# Patient Record
Sex: MALE | Race: WHITE | Employment: FULL TIME | URBAN - METROPOLITAN AREA
[De-identification: names, ages, dates, MRNs, and addresses within clinical notes are randomized per-mention and may not be internally consistent; named-entity substitution may affect disease eponyms.]

---

## 2019-06-19 ENCOUNTER — APPOINTMENT (OUTPATIENT)
Dept: CT IMAGING | Age: 39
DRG: 092 | End: 2019-06-19

## 2019-06-19 ENCOUNTER — HOSPITAL ENCOUNTER (INPATIENT)
Age: 39
LOS: 1 days | Discharge: HOME | DRG: 092 | End: 2019-06-20
Attending: EMERGENCY MEDICINE | Admitting: FAMILY MEDICINE

## 2019-06-19 DIAGNOSIS — R44.9 SENSORY DEFICIT, LEFT: ICD-10-CM

## 2019-06-19 DIAGNOSIS — R47.1 DYSARTHRIA: Primary | ICD-10-CM

## 2019-06-19 PROBLEM — I63.9 ACUTE CVA (CEREBROVASCULAR ACCIDENT) (HCC): Status: ACTIVE | Noted: 2019-06-19

## 2019-06-19 PROBLEM — S91.319A FOOT LACERATION: Status: ACTIVE | Noted: 2019-06-19

## 2019-06-19 PROBLEM — R47.81 SLURRED SPEECH: Status: ACTIVE | Noted: 2019-06-19

## 2019-06-19 LAB
A/G RATIO: 1.4 (ref 1.1–2.2)
ALBUMIN SERPL-MCNC: 4.4 G/DL (ref 3.4–5)
ALP BLD-CCNC: 85 U/L (ref 40–129)
ALT SERPL-CCNC: 29 U/L (ref 10–40)
ANION GAP SERPL CALCULATED.3IONS-SCNC: 14 MMOL/L (ref 3–16)
APTT: 34.7 SEC (ref 26–36)
AST SERPL-CCNC: 24 U/L (ref 15–37)
BASOPHILS ABSOLUTE: 0 K/UL (ref 0–0.2)
BASOPHILS RELATIVE PERCENT: 0.5 %
BILIRUB SERPL-MCNC: 0.4 MG/DL (ref 0–1)
BUN BLDV-MCNC: 11 MG/DL (ref 7–20)
CALCIUM SERPL-MCNC: 9.5 MG/DL (ref 8.3–10.6)
CHLORIDE BLD-SCNC: 104 MMOL/L (ref 99–110)
CO2: 23 MMOL/L (ref 21–32)
CREAT SERPL-MCNC: 0.7 MG/DL (ref 0.9–1.3)
EOSINOPHILS ABSOLUTE: 0.2 K/UL (ref 0–0.6)
EOSINOPHILS RELATIVE PERCENT: 2.4 %
GFR AFRICAN AMERICAN: >60
GFR NON-AFRICAN AMERICAN: >60
GLOBULIN: 3.1 G/DL
GLUCOSE BLD-MCNC: 119 MG/DL (ref 70–99)
HCT VFR BLD CALC: 42 % (ref 40.5–52.5)
HEMOGLOBIN: 14.2 G/DL (ref 13.5–17.5)
INR BLD: 1.18 (ref 0.86–1.14)
LYMPHOCYTES ABSOLUTE: 2.1 K/UL (ref 1–5.1)
LYMPHOCYTES RELATIVE PERCENT: 33.7 %
MCH RBC QN AUTO: 26.3 PG (ref 26–34)
MCHC RBC AUTO-ENTMCNC: 33.7 G/DL (ref 31–36)
MCV RBC AUTO: 78 FL (ref 80–100)
MONOCYTES ABSOLUTE: 0.5 K/UL (ref 0–1.3)
MONOCYTES RELATIVE PERCENT: 7.6 %
NEUTROPHILS ABSOLUTE: 3.5 K/UL (ref 1.7–7.7)
NEUTROPHILS RELATIVE PERCENT: 55.8 %
PDW BLD-RTO: 14.1 % (ref 12.4–15.4)
PLATELET # BLD: 211 K/UL (ref 135–450)
PMV BLD AUTO: 7.6 FL (ref 5–10.5)
POTASSIUM REFLEX MAGNESIUM: 4 MMOL/L (ref 3.5–5.1)
PROTHROMBIN TIME: 13.5 SEC (ref 9.8–13)
RBC # BLD: 5.39 M/UL (ref 4.2–5.9)
SODIUM BLD-SCNC: 141 MMOL/L (ref 136–145)
TOTAL PROTEIN: 7.5 G/DL (ref 6.4–8.2)
TROPONIN: <0.01 NG/ML
WBC # BLD: 6.2 K/UL (ref 4–11)

## 2019-06-19 PROCEDURE — 85730 THROMBOPLASTIN TIME PARTIAL: CPT

## 2019-06-19 PROCEDURE — 6360000004 HC RX CONTRAST MEDICATION: Performed by: EMERGENCY MEDICINE

## 2019-06-19 PROCEDURE — 93005 ELECTROCARDIOGRAM TRACING: CPT | Performed by: STUDENT IN AN ORGANIZED HEALTH CARE EDUCATION/TRAINING PROGRAM

## 2019-06-19 PROCEDURE — 84484 ASSAY OF TROPONIN QUANT: CPT

## 2019-06-19 PROCEDURE — 90715 TDAP VACCINE 7 YRS/> IM: CPT | Performed by: STUDENT IN AN ORGANIZED HEALTH CARE EDUCATION/TRAINING PROGRAM

## 2019-06-19 PROCEDURE — 80053 COMPREHEN METABOLIC PANEL: CPT

## 2019-06-19 PROCEDURE — 85610 PROTHROMBIN TIME: CPT

## 2019-06-19 PROCEDURE — 99285 EMERGENCY DEPT VISIT HI MDM: CPT

## 2019-06-19 PROCEDURE — 70498 CT ANGIOGRAPHY NECK: CPT

## 2019-06-19 PROCEDURE — 85025 COMPLETE CBC W/AUTO DIFF WBC: CPT

## 2019-06-19 PROCEDURE — 70496 CT ANGIOGRAPHY HEAD: CPT

## 2019-06-19 PROCEDURE — 36415 COLL VENOUS BLD VENIPUNCTURE: CPT

## 2019-06-19 PROCEDURE — 6360000002 HC RX W HCPCS: Performed by: STUDENT IN AN ORGANIZED HEALTH CARE EDUCATION/TRAINING PROGRAM

## 2019-06-19 PROCEDURE — 90471 IMMUNIZATION ADMIN: CPT | Performed by: STUDENT IN AN ORGANIZED HEALTH CARE EDUCATION/TRAINING PROGRAM

## 2019-06-19 PROCEDURE — 70450 CT HEAD/BRAIN W/O DYE: CPT

## 2019-06-19 PROCEDURE — 1200000000 HC SEMI PRIVATE

## 2019-06-19 RX ADMIN — TETANUS TOXOID, REDUCED DIPHTHERIA TOXOID AND ACELLULAR PERTUSSIS VACCINE, ADSORBED 0.5 ML: 5; 2.5; 8; 8; 2.5 SUSPENSION INTRAMUSCULAR at 22:10

## 2019-06-19 RX ADMIN — IOPAMIDOL 80 ML: 755 INJECTION, SOLUTION INTRAVENOUS at 21:59

## 2019-06-20 ENCOUNTER — APPOINTMENT (OUTPATIENT)
Dept: MRI IMAGING | Age: 39
DRG: 092 | End: 2019-06-20

## 2019-06-20 VITALS
HEIGHT: 74 IN | RESPIRATION RATE: 18 BRPM | SYSTOLIC BLOOD PRESSURE: 130 MMHG | OXYGEN SATURATION: 97 % | BODY MASS INDEX: 40.43 KG/M2 | DIASTOLIC BLOOD PRESSURE: 83 MMHG | HEART RATE: 69 BPM | WEIGHT: 315 LBS | TEMPERATURE: 97.6 F

## 2019-06-20 LAB
CHOLESTEROL, TOTAL: 138 MG/DL (ref 0–199)
EKG ATRIAL RATE: 73 BPM
EKG DIAGNOSIS: NORMAL
EKG P AXIS: 39 DEGREES
EKG P-R INTERVAL: 162 MS
EKG Q-T INTERVAL: 396 MS
EKG QRS DURATION: 94 MS
EKG QTC CALCULATION (BAZETT): 436 MS
EKG R AXIS: 26 DEGREES
EKG T AXIS: 15 DEGREES
EKG VENTRICULAR RATE: 73 BPM
HCT VFR BLD CALC: 42.6 % (ref 40.5–52.5)
HDLC SERPL-MCNC: 23 MG/DL (ref 40–60)
HEMOGLOBIN: 14.3 G/DL (ref 13.5–17.5)
LDL CHOLESTEROL CALCULATED: 80 MG/DL
LV EF: 50 %
LVEF MODALITY: NORMAL
MCH RBC QN AUTO: 26.3 PG (ref 26–34)
MCHC RBC AUTO-ENTMCNC: 33.5 G/DL (ref 31–36)
MCV RBC AUTO: 78.5 FL (ref 80–100)
PDW BLD-RTO: 14.2 % (ref 12.4–15.4)
PLATELET # BLD: 206 K/UL (ref 135–450)
PMV BLD AUTO: 7.6 FL (ref 5–10.5)
RBC # BLD: 5.43 M/UL (ref 4.2–5.9)
TRIGL SERPL-MCNC: 175 MG/DL (ref 0–150)
VLDLC SERPL CALC-MCNC: 35 MG/DL
WBC # BLD: 6.8 K/UL (ref 4–11)

## 2019-06-20 PROCEDURE — 83036 HEMOGLOBIN GLYCOSYLATED A1C: CPT

## 2019-06-20 PROCEDURE — 70551 MRI BRAIN STEM W/O DYE: CPT

## 2019-06-20 PROCEDURE — 6360000002 HC RX W HCPCS: Performed by: FAMILY MEDICINE

## 2019-06-20 PROCEDURE — 97161 PT EVAL LOW COMPLEX 20 MIN: CPT

## 2019-06-20 PROCEDURE — 6370000000 HC RX 637 (ALT 250 FOR IP): Performed by: FAMILY MEDICINE

## 2019-06-20 PROCEDURE — 80061 LIPID PANEL: CPT

## 2019-06-20 PROCEDURE — 97165 OT EVAL LOW COMPLEX 30 MIN: CPT

## 2019-06-20 PROCEDURE — 92610 EVALUATE SWALLOWING FUNCTION: CPT

## 2019-06-20 PROCEDURE — 85027 COMPLETE CBC AUTOMATED: CPT

## 2019-06-20 PROCEDURE — 2580000003 HC RX 258: Performed by: FAMILY MEDICINE

## 2019-06-20 PROCEDURE — 36415 COLL VENOUS BLD VENIPUNCTURE: CPT

## 2019-06-20 PROCEDURE — 6360000004 HC RX CONTRAST MEDICATION: Performed by: INTERNAL MEDICINE

## 2019-06-20 PROCEDURE — C8929 TTE W OR WO FOL WCON,DOPPLER: HCPCS

## 2019-06-20 RX ORDER — ASPIRIN 81 MG/1
81 TABLET ORAL DAILY
Status: DISCONTINUED | OUTPATIENT
Start: 2019-06-20 | End: 2019-06-20 | Stop reason: HOSPADM

## 2019-06-20 RX ORDER — SODIUM CHLORIDE 0.9 % (FLUSH) 0.9 %
10 SYRINGE (ML) INJECTION EVERY 12 HOURS SCHEDULED
Status: DISCONTINUED | OUTPATIENT
Start: 2019-06-20 | End: 2019-06-20 | Stop reason: HOSPADM

## 2019-06-20 RX ORDER — ATORVASTATIN CALCIUM 80 MG/1
80 TABLET, FILM COATED ORAL NIGHTLY
Status: DISCONTINUED | OUTPATIENT
Start: 2019-06-20 | End: 2019-06-20 | Stop reason: HOSPADM

## 2019-06-20 RX ORDER — ONDANSETRON 2 MG/ML
4 INJECTION INTRAMUSCULAR; INTRAVENOUS EVERY 6 HOURS PRN
Status: DISCONTINUED | OUTPATIENT
Start: 2019-06-20 | End: 2019-06-20 | Stop reason: HOSPADM

## 2019-06-20 RX ORDER — SODIUM CHLORIDE 0.9 % (FLUSH) 0.9 %
10 SYRINGE (ML) INJECTION PRN
Status: DISCONTINUED | OUTPATIENT
Start: 2019-06-20 | End: 2019-06-20 | Stop reason: HOSPADM

## 2019-06-20 RX ORDER — ASPIRIN 300 MG/1
300 SUPPOSITORY RECTAL DAILY
Status: DISCONTINUED | OUTPATIENT
Start: 2019-06-20 | End: 2019-06-20 | Stop reason: HOSPADM

## 2019-06-20 RX ORDER — LABETALOL 20 MG/4 ML (5 MG/ML) INTRAVENOUS SYRINGE
10 EVERY 10 MIN PRN
Status: DISCONTINUED | OUTPATIENT
Start: 2019-06-20 | End: 2019-06-20 | Stop reason: HOSPADM

## 2019-06-20 RX ADMIN — ENOXAPARIN SODIUM 40 MG: 40 INJECTION SUBCUTANEOUS at 08:41

## 2019-06-20 RX ADMIN — PERFLUTREN 2.2 MG: 6.52 INJECTION, SUSPENSION INTRAVENOUS at 12:54

## 2019-06-20 RX ADMIN — ATORVASTATIN CALCIUM 80 MG: 80 TABLET, FILM COATED ORAL at 02:02

## 2019-06-20 RX ADMIN — Medication 10 ML: at 09:00

## 2019-06-20 RX ADMIN — ASPIRIN 81 MG: 81 TABLET, COATED ORAL at 08:41

## 2019-06-20 ASSESSMENT — PAIN SCALES - GENERAL
PAINLEVEL_OUTOF10: 0

## 2019-06-20 NOTE — PROGRESS NOTES
Occupational Therapy   Occupational Therapy Initial Assessment and Discharge   Date: 2019   Patient Name: Connie Maguire  MRN: [id number]     : 1980    Date of Service: 2019    Discharge Recommendations: Connie Maguire scored a 24/24 on the AM-PAC ADL Inpatient form. At this time, no further OT is recommended upon discharge. Recommend patient returns to prior setting with prior services. Assessment: Pt's functional independence appears at baseline. Pt has no residual deficits, related to ADLs, strength, ROM, or balance. Pt has been getting up ad luda in room. No further acute OT needs identified. Will sign off. OT Equipment Recommendations  Equipment Needed: No    Assessment   Performance deficits / Impairments: Decreased functional mobility ; Decreased ADL status; Decreased balance    Decision Making: Low Complexity    Patient Education: Role of OT- pt verb understanding    REQUIRES OT FOLLOW UP: No    Activity Tolerance: Patient Tolerated treatment well    Safety Devices in place: Yes  Type of devices: Left in chair;Nurse notified;Call light within reach               Restrictions  Position Activity Restriction  Other position/activity restrictions: up as tolerated    Subjective   General  Chart Reviewed: Yes    Additional Pertinent Hx: 37 y.o. M adm  with aphasia and mild right facial droop. Head CT, CTA of head/neck all (-) for acute findings. MRI pending. PMHx= L knee replacement    Family / Caregiver Present: No    Diagnosis: Acute CVA    Subjective  Subjective: Pt found eob upon entry; agreeable to OT.      Pain Assessment: Denies     Social/Functional History  Social/Functional History  Lives With: (Significant other + 4 kids (9-23 y.o.))  Type of Home: House  Home Layout: Two level, Performs ADL's on one level  Home Access: Stairs to enter with rails  Entrance Stairs - Number of Steps: 2 NARCISA  Bathroom Shower/Tub: Tub/Shower unit  Bathroom Toilet: Standard(\"comfort height,\" [facility] nearby for leverage)  Bathroom Equipment: (none)  Home Equipment: (none)  ADL Assistance: Independent  Homemaking Assistance: Independent  Ambulation Assistance: Independent  Transfer Assistance: Independent  Active : Yes  Occupation: Full time employment  Type of occupation: Plumbing       Objective   Treatment included functional transfer training, ADLs, and patient education. Vision: Within Functional Limits  Hearing: Within functional limits      Orientation  Overall Orientation Status: Within Functional Limits    Standing Balance  Activity: functional mobility in room/restroom and hallway, standing for ADLs, transfers     Functional Mobility  Functional - Mobility Device: No device  Activity: To/from bathroom(+ hallway)  Assist Level: Independent    Toilet Transfers  Toilet - Technique: Ambulating  Equipment Used: Standard toilet  Toilet Transfer: Independent    ADL  Feeding: Independent  Grooming: Independent  UE Dressing: Independent  LE Dressing: Independent  Additional Comments: Pt able to complete all ADL tasks in combination of sitting/standing (don shirt, shorts, shoes)    Bed mobility  Supine to Sit: Independent  Sit to Supine: Independent    Transfers  Sit to stand: Independent  Stand to sit: Independent    Cognition  Overall Cognitive Status: WFL    Sensation  Overall Sensation Status: WFL    LUE AROM (degrees)  LUE AROM : WFL  RUE AROM (degrees)  RUE AROM : WFL  LUE Strength  Gross LUE Strength: WFL  RUE Strength  Gross RUE Strength: WFL         Plan   Plan  Times per week: d/c       AM-PAC Score        AM-[facility] Inpatient Daily Activity Raw Score: 24 (06/20/19 0837)  AM-PAC Inpatient ADL T-Scale Score : 57.54 (06/20/19 0837)  ADL Inpatient CMS 0-100% Score: 0 (06/20/19 0837)  ADL Inpatient CMS G-Code Modifier : 509 [address] (06/20/19 8781)    Goals  Patient Goals   Patient goals :  To return home today        Therapy Time   Individual Concurrent Group Co-treatment   Time In 0740         Time Out 0800         Minutes 20         Timed Code Treatment Minutes:   0    Total Treatment Minutes:  [facility] Pat Nakul Lot

## 2019-06-20 NOTE — CARE COORDINATION
discharge, or pharmacy can deliver to the bedside if staff is available. (payment due at time of pick-up or delivery - cash, check, or card accepted)     Able to afford home medications/ co-pay costs: Yes    ADLS  Support Systems: Spouse/Significant Other    PT AM-PAC: 24 /24  OT AM-PAC: 24 /24    [place]: from single family home  Steps: 2 steps to enter    Plans to RETURN to current housing: Yes  Barriers to RETURNING to current housing: None noted    Home Care Information  Currently ACTIVE with [facility] [address]: No  Home Care Agency: Not Applicable    Currently ACTIVE with [place] [facility]: No  Passport/ Waiver: No  Passport/ Waiver Services: Not Applicable              DISCHARGE PLAN:  Disposition: Home    Transportation PLAN for discharge: family     Factors facilitating achievement of predicted outcomes: Family support, Cooperative and Pleasant    Barriers to discharge: waiting for an MRI    Additional Case Management Notes:may need OP therapy    Gerry Meza and his family were provided with choice of provider; he and his family are in agreement with the discharge plan.     Care Transition patient: No    Chelsie Head RN  [facility] [email or web address]  Case Management Department  Ph: [phone]   Fax: [phone]

## 2019-06-20 NOTE — ED PROVIDER NOTES
He appears well-developed and well-nourished. No distress. HENT:   Head: Normocephalic and atraumatic. Mouth/Throat: Oropharynx is clear and moist. No oropharyngeal exudate. Eyes: Pupils are equal, round, and reactive to light. Conjunctivae and EOM are normal. Right eye exhibits no discharge. Left eye exhibits no discharge. No scleral icterus. Neck: Normal range of motion. Neck supple. No JVD present. No tracheal deviation present. No thyromegaly present. Neck is large   Cardiovascular: Normal rate, regular rhythm, normal heart sounds and intact distal pulses. Exam reveals no gallop and no friction rub. No murmur heard. Pulmonary/Chest: Effort normal and breath sounds normal. No stridor. No respiratory distress. He has no wheezes. He has no rales. Abdominal: Soft. Bowel sounds are normal. He exhibits no distension and no mass. There is no tenderness. There is no rebound and no guarding. Musculoskeletal: Normal range of motion. He exhibits no edema, tenderness or deformity. Lymphadenopathy:     He has no cervical adenopathy. Neurological: He is alert and oriented to person, place, and time. He displays normal reflexes. A sensory deficit is present. No cranial nerve deficit. He exhibits normal muscle tone. Coordination normal.   Mild right sided facial droop. Dysarthria noted, patient slurs some words, but not severe. Cranial nerves otherwise unremarkable. Decreased sensation in plantar aspect of right foot in region of incision. No dysmetria. Normal heel-to-shin. Skin: Skin is warm and dry. No rash noted. He is not diaphoretic. Healing incision along the plantar aspect of the right foot near calcaneous with surrounding erythema. No purulence expressed. No palpable fluid collection   Psychiatric: He has a normal mood and affect.  His behavior is normal. Judgment and thought content normal.        Diagnostic Results     EKG   Interpreted by emergency department physician Carla Paez, significant vascular abnormality in the neck. 2. Prominent lymph nodes presumably normal for patient age. No suspicious abnormality otherwise. PROCEDURE: CT ANGIOGRAPHY HEAD WITH/WITHOUT CONTRAST      INDICATION: Possible stroke. Dysarthria w/ right sided facial droop      COMPARISON: none      TECHNIQUE: Axial CT imaging obtained through the head prior to and following administration of IV contrast. Axial images, multiplanar reformatted images, and maximum intensity projection images were reviewed for CT angiographic technique. IV contrast: 80 mL Isovue-370. FINDINGS:      ANTERIOR CIRCULATION: The intracranial internal carotid arteries, anterior cerebral arteries, and middle cerebral arteries demonstrate no occlusion or stenosis. No evidence for aneurysm or arteriovenous malformation. POSTERIOR CIRCULATION: The bilateral vertebral arteries, basilar artery and posterior cerebral arteries demonstrate no occlusion or stenosis. No evidence for aneurysm or arteriovenous malformation. INTRACRANIAL VENOUS SYSTEM: No evidence for intracranial venous thrombosis. INTRACRANIAL HEMORRHAGE: None. VENTRICLES: Normal in size and configuration for age. BRAIN PARENCHYMA: Gray-white matter differentiation is normal. No intracranial mass effect. SKULL: No destructive osseous process or fracture. PARANASAL SINUSES / MASTOIDS: No acute sinusitis or mastoiditis. ORBITS: Normal.      EXTRACRANIAL SOFT TISSUES: Normal.      OTHER: None. IMPRESSION:      1. No significant intracranial vascular abnormality. Overall normal CT imaging of the head. CT HEAD WO CONTRAST   Final Result   1. No acute intracranial abnormality. LABS:   Labs Reviewed   CBC WITH AUTO DIFFERENTIAL - Abnormal; Notable for the following components:       Result Value    MCV 78.0 (*)     All other components within normal limits    Narrative:     Performed at:   [facility]  [address] [facility] [address]   Phone [phone]   COMPREHENSIVE METABOLIC PANEL W/ REFLEX TO MG FOR LOW K - Abnormal; Notable for the following components:    Glucose 119 (*)     CREATININE 0.7 (*)     All other components within normal limits    Narrative:     Performed at: [facility] - [facility]  [address], [facility] [address]   Phone [phone]   PROTIME-INR - Abnormal; Notable for the following components:    Protime 13.5 (*)     INR 1.18 (*)     All other components within normal limits    Narrative:     Performed at: [facility]  [address], [facility] [address]   Phone [phone]   APTT    Narrative:     Performed at: [facility]  [address] [facility] [address]   Phone [phone]   TROPONIN    Narrative:     Performed at: [facility]  [address] [facility] [address]   Phone [phone]       RECENT VITALS:  BP: 136/73,  , Pulse: 71,Resp: 16     Procedures     None    ED Course     The patient was given the following medications:  Orders Placed This Encounter   Medications    iopamidol (ISOVUE-370) 76 % injection 80 mL    Tetanus-Diphth-Acell Pertussis (BOOSTRIX) injection 0.5 mL       CONSULTS:  Minesh Vazquez     Trent Garcia is a 37 y.o. male with no known PMH who presented with slurred speech. Did have mild right-sided facial droop and left sided sensory deficits on exam, but symptoms began 6/16-6/17 so outside the window for intervention. CT head w/o contrast and CTA head and neck unremarkable. Patient will be admitted for stroke workup. He stepped on a floresita razor on 6/14, unsure of last tetanus shot.   He endorsed some muscle cramping in his right calf, but otherwise no lock-jaw or symptoms concerning for tetanus. He was given Tdap in the ED prior to admission. This patient was also evaluated by the attending physician. All care plans were discussed and agreed upon. Clinical Impression     1. Dysarthria    2. Sensory deficit, left        Disposition/Plan     PATIENT REFERRED TO:  No follow-up provider specified.     DISCHARGE MEDICATIONS:  New Prescriptions    No medications on file       DISPOSITION Admitted 06/19/2019 11:37:19 PM       Patsy Villatoro MD  Resident  06/19/19 9974

## 2019-06-20 NOTE — DISCHARGE SUMMARY
Hospital Medicine Discharge Summary    Patient ID: Jeanie Fajardo      Patient's PCP: No primary care provider on file. Admit Date: 6/19/2019     Discharge Date:   06/20/19    Admitting Physician: Tray Harrison MD     Discharge Physician: Aminata Bernal MD     Discharge Diagnoses: Active Hospital Problems    Diagnosis Date Noted    Slurred speech [R47.81] 06/19/2019    Foot laceration [S91.319A] 06/19/2019    Acute CVA (cerebrovascular accident) [facility]) [I63.9] 06/19/2019       The patient was seen and examined on day of discharge and this discharge summary is in conjunction with any daily progress note from day of discharge. Hospital Course:   Slurred speech:  Pt had gait disturbances and slurred speech. Was thought to be secondary to TIA/stroke. Aspirin and statin was started. Neurology was consulted. CT head/CTA was negative. Lipid panel showed hypertriglyceridemia. Echo was normal. MRI showed chronic small vessel changes. Symptoms had been improving per wife. Pt works as a . Neurology recommended no further workup, discontinuing aspirin and statin as pt symptoms were likely from toxin or possible virus. Also if recurrent episodes happen, pt to return to hospital and will need EEG/CSF studies. Diet and exercise was advised. Pt to schedule an appointment with a PCP.     R foot laceration:  Was healing. Tdap was given in the ED.     Morbid Obesity:  Diet and exercise advised      Physical Exam Performed:     /83   Pulse 69   Temp 97.6 °F (36.4 °C) (Oral)   Resp 18   Ht 6' 2\" (1.88 m)   Wt (!) 327 lb 8 oz (148.6 kg)   SpO2 97%   BMI 42.05 kg/m²       General appearance:  No apparent distress, appears stated age and cooperative. Morbid Obesity  HEENT:  Normal cephalic, atraumatic without obvious deformity. Pupils equal, round, and reactive to light. Extra ocular muscles intact. Conjunctivae/corneas clear. Neck: Supple, with full range of motion.  No jugular venous distention. Trachea midline. Respiratory:  Normal respiratory effort. Clear to auscultation, bilaterally without Rales/Wheezes/Rhonchi. Cardiovascular:  Regular rate and rhythm with normal S1/S2 without murmurs, rubs or gallops. Abdomen: Soft, non-tender, non-distended with normal bowel sounds. Musculoskeletal:  No clubbing, cyanosis or edema bilaterally. Full range of motion without deformity. Skin: Skin color, texture, turgor normal.  No rashes or lesions. Neurologic:  Neurovascularly intact without any focal sensory/motor deficits. Cranial nerves: II-XII intact, grossly non-focal.  Psychiatric:  Alert and oriented, thought content appropriate, normal insight  Capillary Refill: Brisk,< 3 seconds   Peripheral Pulses: +2 palpable, equal bilaterally       Labs: For convenience and continuity at follow-up the following most recent labs are provided:      CBC:    Lab Results   Component Value Date    WBC 6.8 06/20/2019    HGB 14.3 06/20/2019    HCT 42.6 06/20/2019     06/20/2019       Renal:    Lab Results   Component Value Date     06/19/2019    K 4.0 06/19/2019     06/19/2019    CO2 23 06/19/2019    BUN 11 06/19/2019    CREATININE 0.7 06/19/2019    CALCIUM 9.5 06/19/2019         Significant Diagnostic Studies    Radiology:   MRI brain without contrast   Final Result      1. No acute intracranial abnormality. No evidence of acute ischemia. 2. FLAIR images and straight minimal punctate nonspecific white matter signal abnormalities which may reflect chronic small vessel ischemic disease and/or age related degenerative change. CTA NECK W CONTRAST   Final Result      1. No significant vascular abnormality in the neck. 2. Prominent lymph nodes presumably normal for patient age. No suspicious abnormality otherwise. PROCEDURE: CT ANGIOGRAPHY HEAD WITH/WITHOUT CONTRAST      INDICATION: Possible stroke.  Dysarthria w/ right sided facial droop      COMPARISON: none

## 2019-06-20 NOTE — PROGRESS NOTES
Education: Role of SLP  Patient Education Response: Verbalizes understanding  Safety Devices in place: Yes  Type of devices: Call light within reach           Therapy Time  SLP Individual Minutes  Time In: 0820  Time Out: 0840  Minutes: 20          Pt's goal: pt denied difficulty with communication and swallowing so did not state goal       Plan:  Continue goals per POC  Recommended diet:regular with thin liquids-Make NPO if s/s aspiration emerge and alert SLP  Will monitor MRI results- if + for CVA, will follow up, if not, then will d/c from Speech Therapy services   Total treatment time:20  Pt's discharge plan:to go home   Discharge Plan: To be determined closer to discharge  Discussed with RNAntonio   Needs within reach.        Harry Ralph, [address] [place] [address] Risa, [facility]- [address] [place] [address]  Pg # [phone]  This document will serve as a discharge summary if pt discharge before next treatment   session

## 2019-06-21 LAB
EKG ATRIAL RATE: 78 BPM
EKG DIAGNOSIS: NORMAL
EKG P AXIS: 37 DEGREES
EKG P-R INTERVAL: 162 MS
EKG Q-T INTERVAL: 394 MS
EKG QRS DURATION: 96 MS
EKG QTC CALCULATION (BAZETT): 449 MS
EKG R AXIS: 25 DEGREES
EKG T AXIS: 14 DEGREES
EKG VENTRICULAR RATE: 78 BPM
ESTIMATED AVERAGE GLUCOSE: 168.6 MG/DL
HBA1C MFR BLD: 7.5 %

## 2019-07-01 ENCOUNTER — APPOINTMENT (OUTPATIENT)
Dept: CT IMAGING | Age: 39
End: 2019-07-01

## 2019-07-01 ENCOUNTER — HOSPITAL ENCOUNTER (EMERGENCY)
Age: 39
Discharge: HOME | End: 2019-07-01
Attending: EMERGENCY MEDICINE

## 2019-07-01 VITALS
RESPIRATION RATE: 33 BRPM | OXYGEN SATURATION: 100 % | WEIGHT: 315 LBS | DIASTOLIC BLOOD PRESSURE: 53 MMHG | SYSTOLIC BLOOD PRESSURE: 122 MMHG | BODY MASS INDEX: 40.43 KG/M2 | HEIGHT: 74 IN | HEART RATE: 86 BPM | TEMPERATURE: 98.9 F

## 2019-07-01 DIAGNOSIS — F11.10 OPIATE ABUSE, EPISODIC (HCC): ICD-10-CM

## 2019-07-01 DIAGNOSIS — R41.82 ALTERED MENTAL STATUS, UNSPECIFIED ALTERED MENTAL STATUS TYPE: Primary | ICD-10-CM

## 2019-07-01 LAB
ALBUMIN SERPL-MCNC: 4 G/DL (ref 3.4–5)
ALP BLD-CCNC: 95 U/L (ref 40–129)
ALT SERPL-CCNC: 27 U/L (ref 10–40)
AMPHETAMINE SCREEN, URINE: POSITIVE
ANION GAP SERPL CALCULATED.3IONS-SCNC: 11 MMOL/L (ref 3–16)
AST SERPL-CCNC: 24 U/L (ref 15–37)
BACTERIA: ABNORMAL /HPF
BARBITURATE SCREEN URINE: ABNORMAL
BASE EXCESS VENOUS: 0 (ref -3–3)
BASOPHILS ABSOLUTE: 0.1 K/UL (ref 0–0.2)
BASOPHILS RELATIVE PERCENT: 0.8 %
BENZODIAZEPINE SCREEN, URINE: POSITIVE
BILIRUB SERPL-MCNC: <0.2 MG/DL (ref 0–1)
BILIRUBIN DIRECT: <0.2 MG/DL (ref 0–0.3)
BILIRUBIN URINE: NEGATIVE
BILIRUBIN, INDIRECT: NORMAL MG/DL (ref 0–1)
BLOOD, URINE: NEGATIVE
BUN BLDV-MCNC: 14 MG/DL (ref 7–20)
CALCIUM SERPL-MCNC: 9.2 MG/DL (ref 8.3–10.6)
CANNABINOID SCREEN URINE: POSITIVE
CASTS: ABNORMAL /LPF
CHLORIDE BLD-SCNC: 102 MMOL/L (ref 99–110)
CLARITY: CLEAR
CO2: 26 MMOL/L (ref 21–32)
COCAINE METABOLITE SCREEN URINE: ABNORMAL
COLOR: YELLOW
CREAT SERPL-MCNC: 0.8 MG/DL (ref 0.9–1.3)
EKG ATRIAL RATE: 83 BPM
EKG DIAGNOSIS: NORMAL
EKG P AXIS: 51 DEGREES
EKG P-R INTERVAL: 162 MS
EKG Q-T INTERVAL: 364 MS
EKG QRS DURATION: 96 MS
EKG QTC CALCULATION (BAZETT): 427 MS
EKG R AXIS: 65 DEGREES
EKG T AXIS: 37 DEGREES
EKG VENTRICULAR RATE: 83 BPM
EOSINOPHILS ABSOLUTE: 0.2 K/UL (ref 0–0.6)
EOSINOPHILS RELATIVE PERCENT: 3.3 %
GFR AFRICAN AMERICAN: >60
GFR NON-AFRICAN AMERICAN: >60
GLUCOSE BLD-MCNC: 199 MG/DL (ref 70–99)
GLUCOSE URINE: NEGATIVE MG/DL
HCO3 VENOUS: 26.3 MMOL/L (ref 23–29)
HCT VFR BLD CALC: 39.7 % (ref 40.5–52.5)
HEMOGLOBIN: 13.1 G/DL (ref 13.5–17.5)
KETONES, URINE: NEGATIVE MG/DL
LACTATE: 2.2 MMOL/L (ref 0.4–2)
LEUKOCYTE ESTERASE, URINE: NEGATIVE
LIPASE: 45 U/L (ref 13–60)
LYMPHOCYTES ABSOLUTE: 2.3 K/UL (ref 1–5.1)
LYMPHOCYTES RELATIVE PERCENT: 35.6 %
Lab: ABNORMAL
MCH RBC QN AUTO: 26 PG (ref 26–34)
MCHC RBC AUTO-ENTMCNC: 33 G/DL (ref 31–36)
MCV RBC AUTO: 78.9 FL (ref 80–100)
METHADONE SCREEN, URINE: ABNORMAL
MICROSCOPIC EXAMINATION: YES
MONOCYTES ABSOLUTE: 0.4 K/UL (ref 0–1.3)
MONOCYTES RELATIVE PERCENT: 6.8 %
MUCUS: ABNORMAL /LPF
NEUTROPHILS ABSOLUTE: 3.5 K/UL (ref 1.7–7.7)
NEUTROPHILS RELATIVE PERCENT: 53.5 %
NITRITE, URINE: NEGATIVE
O2 SAT, VEN: 74 %
OPIATE SCREEN URINE: POSITIVE
OXYCODONE URINE: ABNORMAL
PCO2, VEN: 50.6 MM HG (ref 40–50)
PDW BLD-RTO: 13.6 % (ref 12.4–15.4)
PERFORMED ON: ABNORMAL
PH UA: 5
PH UA: 5.5 (ref 5–8)
PH VENOUS: 7.32 (ref 7.35–7.45)
PHENCYCLIDINE SCREEN URINE: ABNORMAL
PLATELET # BLD: 211 K/UL (ref 135–450)
PMV BLD AUTO: 7.5 FL (ref 5–10.5)
PO2, VEN: 43 MM HG
POC SAMPLE TYPE: ABNORMAL
POTASSIUM REFLEX MAGNESIUM: 4.4 MMOL/L (ref 3.5–5.1)
PROPOXYPHENE SCREEN: ABNORMAL
PROTEIN UA: ABNORMAL MG/DL
RBC # BLD: 5.03 M/UL (ref 4.2–5.9)
RBC UA: ABNORMAL /HPF (ref 0–2)
SODIUM BLD-SCNC: 139 MMOL/L (ref 136–145)
SPECIFIC GRAVITY UA: >=1.03 (ref 1–1.03)
TCO2 CALC VENOUS: 28 MMOL/L
TOTAL PROTEIN: 7.2 G/DL (ref 6.4–8.2)
TROPONIN: <0.01 NG/ML
URINE REFLEX TO CULTURE: ABNORMAL
URINE TYPE: ABNORMAL
UROBILINOGEN, URINE: 0.2 E.U./DL
WBC # BLD: 6.5 K/UL (ref 4–11)
WBC UA: ABNORMAL /HPF (ref 0–5)

## 2019-07-01 PROCEDURE — 2580000003 HC RX 258: Performed by: STUDENT IN AN ORGANIZED HEALTH CARE EDUCATION/TRAINING PROGRAM

## 2019-07-01 PROCEDURE — 96361 HYDRATE IV INFUSION ADD-ON: CPT

## 2019-07-01 PROCEDURE — 81001 URINALYSIS AUTO W/SCOPE: CPT

## 2019-07-01 PROCEDURE — 83690 ASSAY OF LIPASE: CPT

## 2019-07-01 PROCEDURE — 85025 COMPLETE CBC W/AUTO DIFF WBC: CPT

## 2019-07-01 PROCEDURE — 80076 HEPATIC FUNCTION PANEL: CPT

## 2019-07-01 PROCEDURE — 84484 ASSAY OF TROPONIN QUANT: CPT

## 2019-07-01 PROCEDURE — 70450 CT HEAD/BRAIN W/O DYE: CPT

## 2019-07-01 PROCEDURE — 84443 ASSAY THYROID STIM HORMONE: CPT

## 2019-07-01 PROCEDURE — 93005 ELECTROCARDIOGRAM TRACING: CPT | Performed by: STUDENT IN AN ORGANIZED HEALTH CARE EDUCATION/TRAINING PROGRAM

## 2019-07-01 PROCEDURE — 80048 BASIC METABOLIC PNL TOTAL CA: CPT

## 2019-07-01 PROCEDURE — 83605 ASSAY OF LACTIC ACID: CPT

## 2019-07-01 PROCEDURE — 82803 BLOOD GASES ANY COMBINATION: CPT

## 2019-07-01 PROCEDURE — 80307 DRUG TEST PRSMV CHEM ANLYZR: CPT

## 2019-07-01 PROCEDURE — 96360 HYDRATION IV INFUSION INIT: CPT

## 2019-07-01 PROCEDURE — 99285 EMERGENCY DEPT VISIT HI MDM: CPT

## 2019-07-01 RX ORDER — SODIUM CHLORIDE, SODIUM LACTATE, POTASSIUM CHLORIDE, CALCIUM CHLORIDE 600; 310; 30; 20 MG/100ML; MG/100ML; MG/100ML; MG/100ML
1000 INJECTION, SOLUTION INTRAVENOUS ONCE
Status: COMPLETED | OUTPATIENT
Start: 2019-07-01 | End: 2019-07-01

## 2019-07-01 RX ORDER — NALOXONE HYDROCHLORIDE 4 MG/.1ML
1 SPRAY NASAL PRN
Status: DISCONTINUED | OUTPATIENT
Start: 2019-07-01 | End: 2019-07-01 | Stop reason: HOSPADM

## 2019-07-01 RX ORDER — NALOXONE HYDROCHLORIDE 4 MG/.1ML
1 SPRAY NASAL PRN
COMMUNITY

## 2019-07-01 RX ADMIN — SODIUM CHLORIDE, POTASSIUM CHLORIDE, SODIUM LACTATE AND CALCIUM CHLORIDE 1000 ML: 600; 310; 30; 20 INJECTION, SOLUTION INTRAVENOUS at 18:09

## 2019-07-01 NOTE — ED PROVIDER NOTES
Urine Negative Negative    Ketones, Urine Negative Negative mg/dL    Specific Gravity, UA >=1.030 1.005 - 1.030    Blood, Urine Negative Negative    pH, UA 5.5 5.0 - 8.0    Protein, UA TRACE (A) Negative mg/dL    Urobilinogen, Urine 0.2 <2.0 E.U./dL    Nitrite, Urine Negative Negative    Leukocyte Esterase, Urine Negative Negative    Microscopic Examination YES     Urine Reflex to Culture Not Indicated     Urine Type Other    Microscopic Urinalysis   Result Value Ref Range    Casts 0-1 Hyaline (A) /LPF    Mucus, UA 2+ (A) /LPF    WBC, UA 0-2 0 - 5 /HPF    RBC, UA None seen 0 - 2 /HPF    Bacteria, UA Rare (A) /HPF   POCT Venous   Result Value Ref Range    pH, Hyacinth 7.324 (L) 7.350 - 7.450    pCO2, Hyacinth 50.6 (H) 40.0 - 50.0 mm Hg    pO2, Hyacinth 43 Not Established mm Hg    HCO3, Venous 26.3 23.0 - 29.0 mmol/L    Base Excess, Hyacinth 0 -3 - 3    O2 Sat, Hyacinth 74 Not Established %    TC02 (Calc), Hyacinth 28 Not Established mmol/L    Lactate 2.20 (H) 0.40 - 2.00 mmol/L    Sample Type HYACINTH     Performed on SEE BELOW    EKG 12 Lead   Result Value Ref Range    Ventricular Rate 83 BPM    Atrial Rate 83 BPM    P-R Interval 162 ms    QRS Duration 96 ms    Q-T Interval 364 ms    QTc Calculation (Bazett) 427 ms    P Axis 51 degrees    R Axis 65 degrees    T Axis 37 degrees    Diagnosis       EKG performed in ER and to be interpreted by ER physician. Confirmed by MD, ER ([id number]),  Rebecca Zavala ([phone]) on 7/1/2019 6:46:39 PM       ED BEDSIDE ULTRASOUND:  None    RECENT VITALS:  BP: (!) 122/53, Temp: 98.9 °F (37.2 °C), Pulse: 86,Resp: (!) 33, SpO2: 100 %     Procedures     None    ED Course     Nursing Notes, Past Medical Hx, Past Surgical Hx, Social Hx, Allergies, and Family Hx were reviewed.     The patient was given the followingmedications:  Orders Placed This Encounter   Medications    lactated ringers infusion 1,000 mL    DISCONTD: norepinephrine (LEVOPHED) 16 mg in dextrose 5 % 250 mL infusion    naloxone (NALOXONE TO-GO) 4 mg/0.1 mL

## 2019-07-02 LAB
EKG ATRIAL RATE: 83 BPM
EKG DIAGNOSIS: NORMAL
EKG P AXIS: 51 DEGREES
EKG P-R INTERVAL: 162 MS
EKG Q-T INTERVAL: 364 MS
EKG QRS DURATION: 96 MS
EKG QTC CALCULATION (BAZETT): 427 MS
EKG R AXIS: 65 DEGREES
EKG T AXIS: 37 DEGREES
EKG VENTRICULAR RATE: 83 BPM
TSH SERPL DL<=0.05 MIU/L-ACNC: 3.73 UIU/ML (ref 0.27–4.2)

## 2019-07-02 NOTE — ED NOTES
NALOXONE:  Patient Assessment and Dispensing Record   Date:  7/1/2019  Patient Name: Kiet Capellan  Patient Address: [place] [id number]         Patient Selection: Check that the following condition is met:  [x]  The individual receiving the information and medication is        oriented to person, place, and time and able to understand and learn the        essential components of overdose response and naloxone administration. Indication for dispensing naloxone: (check at least one)  [x]  Previous opioid intoxication or overdose. [x]  History of nonmedical opioid use.   []  Initiation or cessation of methadone or buprenorphine for opioid use disorder        treatment. []  Higher-dose (>50 mg morphine equivalent/day) opioid prescription. []  Receiving any opioid prescription plus (select below):  [] Rotated from one opioid to another because of possible incomplete cross-tolerance. [] Smoking, COPD, emphysema, asthma, sleep apnea, respiratory infection or other respiratory illness. [] Renal dysfunction, hepatic disease, cardiac illness or HIV/AIDS. [] Known or suspected concurrent alcohol use. [] Concurrent benzodiazepine or other sedative prescription. [] Concurrent antidepressant prescription. [] Patients who may have difficulty accessing emergency medical services (distance, remoteness). [x] Voluntary request from a family member, friend,  or other person in a position to assist an individual who is at risk of experiencing an opioid-related overdose. I (the undersigned) attest that:  (check each box to signify completion)  [x]  I am authorized per hospital protocol to dispense naloxone to this patient. [x]  The individual has been screened for contraindications/precautions and        counseled appropriately.   [x]  I have counseled the patient using the Overdose Recognition and Response Guide  [x]  I have updated the Home Med List to reflect this

## 2022-01-05 NOTE — ED TRIAGE NOTES
Pt stepped on a razor on Friday and symptoms of slurred started yesterday Elidel Counseling: Patient may experience a mild burning sensation during topical application. Elidel is not approved in children less than 2 years of age. There have been case reports of hematologic and skin malignancies in patients using topical calcineurin inhibitors although causality is questionable.

## 2022-08-11 NOTE — H&P
Pt complaining of feeling itchy on her right FA and states it feels like a snake.  ED provider made aware.     COMPARISON: none      TECHNIQUE: Axial CT imaging obtained through the head prior to and following administration of IV contrast. Axial images, multiplanar reformatted images, and maximum intensity projection images were reviewed for CT angiographic technique. IV contrast: 80 mL Isovue-370. FINDINGS:      ANTERIOR CIRCULATION: The intracranial internal carotid arteries, anterior cerebral arteries, and middle cerebral arteries demonstrate no occlusion or stenosis. No evidence for aneurysm or arteriovenous malformation. POSTERIOR CIRCULATION: The bilateral vertebral arteries, basilar artery and posterior cerebral arteries demonstrate no occlusion or stenosis. No evidence for aneurysm or arteriovenous malformation. INTRACRANIAL VENOUS SYSTEM: No evidence for intracranial venous thrombosis. INTRACRANIAL HEMORRHAGE: None. VENTRICLES: Normal in size and configuration for age. BRAIN PARENCHYMA: Gray-white matter differentiation is normal. No intracranial mass effect. SKULL: No destructive osseous process or fracture. PARANASAL SINUSES / MASTOIDS: No acute sinusitis or mastoiditis. ORBITS: Normal.      EXTRACRANIAL SOFT TISSUES: Normal.      OTHER: None. IMPRESSION:      1. No significant intracranial vascular abnormality. Overall normal CT imaging of the head. CT HEAD WO CONTRAST   Final Result   1. No acute intracranial abnormality. MRI brain without contrast    (Results Pending)       ASSESSMENT:    Active Hospital Problems    Diagnosis Date Noted    Slurred speech [R47.81] 06/19/2019    Foot laceration [S91.319A] 06/19/2019    Acute CVA (cerebrovascular accident) ([facility] [address] [I63.9] 06/19/2019         PLAN:    Possible TIA/Stroke:  Pt had gait disturbances and slurred speech  CT head/CTA negative  Lipid panel, Echo and MRI pending  Neurology consulted  Cont ASA and plavix    R foot laceration:  Healing. Received Tdap in the ED.     Morbid Obesity      DVT

## 2025-07-03 ENCOUNTER — HOSPITAL ENCOUNTER (EMERGENCY)
Age: 45
Discharge: HOME | End: 2025-07-03
Attending: EMERGENCY MEDICINE

## 2025-07-03 ENCOUNTER — APPOINTMENT (OUTPATIENT)
Dept: GENERAL RADIOLOGY | Age: 45
End: 2025-07-03

## 2025-07-03 VITALS
SYSTOLIC BLOOD PRESSURE: 157 MMHG | OXYGEN SATURATION: 97 % | HEART RATE: 111 BPM | DIASTOLIC BLOOD PRESSURE: 85 MMHG | TEMPERATURE: 98.2 F | RESPIRATION RATE: 20 BRPM

## 2025-07-03 DIAGNOSIS — L60.0 INGROWN TOENAIL OF LEFT FOOT: ICD-10-CM

## 2025-07-03 DIAGNOSIS — L97.522 ULCER OF LEFT FOOT WITH FAT LAYER EXPOSED (HCC): ICD-10-CM

## 2025-07-03 DIAGNOSIS — R73.9 HYPERGLYCEMIA: ICD-10-CM

## 2025-07-03 DIAGNOSIS — L03.116 CELLULITIS OF LEFT LOWER EXTREMITY: Primary | ICD-10-CM

## 2025-07-03 LAB
ANION GAP SERPL CALCULATED.3IONS-SCNC: 12 MMOL/L (ref 3–16)
BASE EXCESS BLDV CALC-SCNC: 4 MMOL/L (ref -2–3)
BASOPHILS # BLD: 0 K/UL (ref 0–0.2)
BASOPHILS NFR BLD: 0.3 %
BUN SERPL-MCNC: 12 MG/DL (ref 7–20)
CALCIUM SERPL-MCNC: 8.8 MG/DL (ref 8.3–10.6)
CHLORIDE SERPL-SCNC: 97 MMOL/L (ref 99–110)
CO2 BLDV-SCNC: 32 MMOL/L
CO2 SERPL-SCNC: 24 MMOL/L (ref 21–32)
COHGB MFR BLDV: 1.6 % (ref 0–1.5)
CREAT SERPL-MCNC: 0.7 MG/DL (ref 0.9–1.3)
CRP SERPL-MCNC: 74.1 MG/L (ref 0–5.1)
DEPRECATED RDW RBC AUTO: 13.7 % (ref 12.4–15.4)
DO-HGB MFR BLDV: 42.1 %
EOSINOPHIL # BLD: 0.2 K/UL (ref 0–0.6)
EOSINOPHIL NFR BLD: 2.1 %
ERYTHROCYTE [SEDIMENTATION RATE] IN BLOOD BY WESTERGREN METHOD: 34 MM/HR (ref 0–15)
GFR SERPLBLD CREATININE-BSD FMLA CKD-EPI: >90 ML/MIN/{1.73_M2}
GLUCOSE SERPL-MCNC: 336 MG/DL (ref 70–99)
HCO3 BLDV-SCNC: 30.3 MMOL/L (ref 24–28)
HCT VFR BLD AUTO: 39.5 % (ref 40.5–52.5)
HGB BLD-MCNC: 13.7 G/DL (ref 13.5–17.5)
LACTATE BLDV-SCNC: 1.3 MMOL/L (ref 0.4–2)
LYMPHOCYTES # BLD: 2.3 K/UL (ref 1–5.1)
LYMPHOCYTES NFR BLD: 24.1 %
MCH RBC QN AUTO: 25.7 PG (ref 26–34)
MCHC RBC AUTO-ENTMCNC: 34.7 G/DL (ref 31–36)
MCV RBC AUTO: 74.1 FL (ref 80–100)
METHGB MFR BLDV: <0 % (ref 0–1.5)
MONOCYTES # BLD: 0.7 K/UL (ref 0–1.3)
MONOCYTES NFR BLD: 7 %
NEUTROPHILS # BLD: 6.2 K/UL (ref 1.7–7.7)
NEUTROPHILS NFR BLD: 66.5 %
PCO2 BLDV: 51.1 MMHG (ref 41–51)
PH BLDV: 7.38 [PH] (ref 7.35–7.45)
PLATELET # BLD AUTO: 216 K/UL (ref 135–450)
PMV BLD AUTO: 7.7 FL (ref 5–10.5)
PO2 BLDV: <30 MMHG (ref 25–40)
POTASSIUM SERPL-SCNC: 4.2 MMOL/L (ref 3.5–5.1)
RBC # BLD AUTO: 5.33 M/UL (ref 4.2–5.9)
SAO2 % BLDV: 57 %
SODIUM SERPL-SCNC: 133 MMOL/L (ref 136–145)
WBC # BLD AUTO: 9.4 K/UL (ref 4–11)

## 2025-07-03 PROCEDURE — 87070 CULTURE OTHR SPECIMN AEROBIC: CPT

## 2025-07-03 PROCEDURE — 99284 EMERGENCY DEPT VISIT MOD MDM: CPT

## 2025-07-03 PROCEDURE — 6360000002 HC RX W HCPCS

## 2025-07-03 PROCEDURE — 83036 HEMOGLOBIN GLYCOSYLATED A1C: CPT

## 2025-07-03 PROCEDURE — 87040 BLOOD CULTURE FOR BACTERIA: CPT

## 2025-07-03 PROCEDURE — 2580000003 HC RX 258: Performed by: EMERGENCY MEDICINE

## 2025-07-03 PROCEDURE — 87075 CULTR BACTERIA EXCEPT BLOOD: CPT

## 2025-07-03 PROCEDURE — 87205 SMEAR GRAM STAIN: CPT

## 2025-07-03 PROCEDURE — 85025 COMPLETE CBC W/AUTO DIFF WBC: CPT

## 2025-07-03 PROCEDURE — 87077 CULTURE AEROBIC IDENTIFY: CPT

## 2025-07-03 PROCEDURE — 87186 SC STD MICRODIL/AGAR DIL: CPT

## 2025-07-03 PROCEDURE — 2500000003 HC RX 250 WO HCPCS: Performed by: EMERGENCY MEDICINE

## 2025-07-03 PROCEDURE — 82803 BLOOD GASES ANY COMBINATION: CPT

## 2025-07-03 PROCEDURE — 96361 HYDRATE IV INFUSION ADD-ON: CPT

## 2025-07-03 PROCEDURE — 85652 RBC SED RATE AUTOMATED: CPT

## 2025-07-03 PROCEDURE — 36415 COLL VENOUS BLD VENIPUNCTURE: CPT

## 2025-07-03 PROCEDURE — 73630 X-RAY EXAM OF FOOT: CPT

## 2025-07-03 PROCEDURE — 6360000002 HC RX W HCPCS: Performed by: EMERGENCY MEDICINE

## 2025-07-03 PROCEDURE — 83605 ASSAY OF LACTIC ACID: CPT

## 2025-07-03 PROCEDURE — 6370000000 HC RX 637 (ALT 250 FOR IP): Performed by: EMERGENCY MEDICINE

## 2025-07-03 PROCEDURE — 96374 THER/PROPH/DIAG INJ IV PUSH: CPT

## 2025-07-03 PROCEDURE — 80048 BASIC METABOLIC PNL TOTAL CA: CPT

## 2025-07-03 PROCEDURE — 86140 C-REACTIVE PROTEIN: CPT

## 2025-07-03 RX ORDER — CEPHALEXIN 500 MG/1
500 CAPSULE ORAL 4 TIMES DAILY
Qty: 40 CAPSULE | Refills: 0 | Status: SHIPPED | OUTPATIENT
Start: 2025-07-03 | End: 2025-07-13

## 2025-07-03 RX ORDER — SULFAMETHOXAZOLE AND TRIMETHOPRIM 800; 160 MG/1; MG/1
1 TABLET ORAL ONCE
Status: COMPLETED | OUTPATIENT
Start: 2025-07-03 | End: 2025-07-03

## 2025-07-03 RX ORDER — LIDOCAINE HYDROCHLORIDE 20 MG/ML
5 INJECTION, SOLUTION INFILTRATION; PERINEURAL ONCE
Status: COMPLETED | OUTPATIENT
Start: 2025-07-03 | End: 2025-07-03

## 2025-07-03 RX ORDER — SODIUM CHLORIDE, SODIUM LACTATE, POTASSIUM CHLORIDE, CALCIUM CHLORIDE 600; 310; 30; 20 MG/100ML; MG/100ML; MG/100ML; MG/100ML
INJECTION, SOLUTION INTRAVENOUS ONCE
Status: COMPLETED | OUTPATIENT
Start: 2025-07-03 | End: 2025-07-03

## 2025-07-03 RX ORDER — SULFAMETHOXAZOLE AND TRIMETHOPRIM 800; 160 MG/1; MG/1
1 TABLET ORAL 2 TIMES DAILY
Qty: 20 TABLET | Refills: 0 | Status: SHIPPED | OUTPATIENT
Start: 2025-07-03 | End: 2025-07-13

## 2025-07-03 RX ADMIN — WATER 2000 MG: 1 INJECTION INTRAMUSCULAR; INTRAVENOUS; SUBCUTANEOUS at 21:52

## 2025-07-03 RX ADMIN — SODIUM CHLORIDE, SODIUM LACTATE, POTASSIUM CHLORIDE, AND CALCIUM CHLORIDE: .6; .31; .03; .02 INJECTION, SOLUTION INTRAVENOUS at 20:36

## 2025-07-03 RX ADMIN — LIDOCAINE HYDROCHLORIDE 5 ML: 20 INJECTION, SOLUTION INFILTRATION; PERINEURAL at 21:52

## 2025-07-03 RX ADMIN — SULFAMETHOXAZOLE AND TRIMETHOPRIM 1 TABLET: 800; 160 TABLET ORAL at 21:51

## 2025-07-03 ASSESSMENT — PAIN DESCRIPTION - LOCATION: LOCATION: FOOT

## 2025-07-03 ASSESSMENT — PAIN SCALES - GENERAL: PAINLEVEL_OUTOF10: 4

## 2025-07-03 ASSESSMENT — LIFESTYLE VARIABLES
HOW OFTEN DO YOU HAVE A DRINK CONTAINING ALCOHOL: NEVER
HOW MANY STANDARD DRINKS CONTAINING ALCOHOL DO YOU HAVE ON A TYPICAL DAY: PATIENT DOES NOT DRINK

## 2025-07-03 ASSESSMENT — PAIN DESCRIPTION - ORIENTATION: ORIENTATION: LEFT

## 2025-07-03 ASSESSMENT — PAIN - FUNCTIONAL ASSESSMENT: PAIN_FUNCTIONAL_ASSESSMENT: 0-10

## 2025-07-04 LAB
BACTERIA BLD CULT ORG #2: NORMAL
BACTERIA BLD CULT: NORMAL

## 2025-07-04 NOTE — CONSULTS
Department of Podiatry Consult Note  Resident      Reason for Consult:  Foot ulcer and cellulitis  Requesting Physician:  Dr. Atul Alba MD    CHIEF COMPLAINT:  Left foot ingrown nail and infection    HISTORY OF PRESENT ILLNESS:    The patient is a 45 y.o. male with significant past medical history as listed below who is consulted to podiatry for left ingrown toenail with infection.  Patient presents to the emergency department today after being sent from the urgent care for evaluation of left great toe infection from an ingrown toenail.  Patient states that he has been dealing with an ingrown toenail on the left foot of his great toe for approximately 1 month now.  Yesterday he noticed increased redness and drainage from the great toe and began feeling nauseous prompting him to seek medical evaluation.  Patient also notes that he has an ulceration on the bottom of his left foot that he has been dealing with for a few months now.  He does change the dressing daily.  Patient is on his feet for work daily and notes that at the end of the day his toe and ulceration site have increased drainage and are more painful.  Patient states that he has not been clinically diagnosed as diabetic.  Patient denies any N/V/F/SOB/CP. Patient denies any other pedal complaints today.    Past Medical History:    No past medical history on file.    Past Surgical History:        Procedure Laterality Date    ANTERIOR CRUCIATE LIGAMENT REPAIR      JOINT REPLACEMENT      lt knee       Allergies:   Patient has no known allergies.    Medications:   Home Meds  No current facility-administered medications on file prior to encounter.     Current Outpatient Medications on File Prior to Encounter   Medication Sig Dispense Refill    naloxone 4 MG/0.1ML LIQD nasal spray 1 spray by Nasal route as needed for Opioid Reversal         Current Meds  lidocaine 2 % injection 5 mL, Once  ceFAZolin (ANCEF) 2,000 mg in sterile water 20 mL IV syringe,

## 2025-07-04 NOTE — ED PROVIDER NOTES
THE [facility]  EMERGENCY DEPARTMENT ENCOUNTER          ATTENDING PHYSICIAN NOTE       Date of evaluation: 7/3/2025    Chief Complaint     Wound Check (Pt c/o a wound/ blister for a month on the bottom of his left foot. PT noticed swelling last night. Pt has felt weaker as the day progressed. )      History of Present Illness     Stephen Owens is a 45 y.o. male who presents to the emergency department with a complaint of a wound and blister on his left foot.  The patient reports that he has had an ingrown toenail on his left foot for several weeks and over the course the past week noted some increased swelling that advance more quickly over the past several days.  Does have some associated constitutional symptoms but denies objective fever.  He denies any nausea or vomiting.  He has never had something similar to this in the past he does not see a podiatrist.  He thinks he may have diabetes but is not sure.    ASSESSMENT / PLAN  (MEDICAL DECISION MAKING)     INITIAL VITALS: BP: (!) 157/85, Temp: 98.2 °F (36.8 °C), Pulse: (!) 111, Respirations: 20, SpO2: 97 %      Stephen Owens is a 45 y.o. male who presented to the Emergency Department with concerns for an infection of the left foot.  On examination clinically had a cellulitis of the left foot with an ingrown toenail that was likely the source but also had what appeared to be a diabetic foot ulceration on the dorsal aspect of his left foot near the midfoot.  This was approximately the size of a dime and did not probe to bone.  He had laboratory studies sent was given IV fluids he had consultation with podiatry as well as x-rays of the left foot obtained.  The patient had a CBC without significant leukocytosis or left shift his inflammatory markers were somewhat elevated his basic metabolic profile did have an elevated glucose at 336 but a normal anion gap normal bicarb sodium was 133 chloride was 97.  The patient was given a liter of IV fluids and was given

## 2025-07-04 NOTE — DISCHARGE INSTRUCTIONS
Please follow-up with podiatry as they have directed.  Please take all of the antibiotics that you have been prescribed.  If you notice the redness expanding beyond the area of the dressings, develop fever, chills, nausea, vomiting, or any other new concerning symptoms please return to the emergency department    An antibiotic ointment dressing was applied to the toe immediately after the procedure. You may apply an antibiotic ointment daily until the wound is completely healed.  Leave your bandage clean and dry for the remainder of the day and tomorrow morning.  Beginning tomorrow afternoon you may remove bandage and shower. Following your shower, soak the toe in warm water and epsom salts for 10 min - perform the epsom salt soaks twice daily for 2 weeks.   Gently dry the area and apply antibiotic ointment.  Avoid baths and swimming pools for the next 2 weeks.   Your bandage will help to pad and protect the wound, while absorbing drainage from the wound.  The toe may drain clear fluid for up to 2 weeks (this is normal).  You can replace the bandage if blood or fluid soaks the bandage.   You may experience some pain after the procedure. If you experience discomfort, elevate your foot.  You may take over the counter Tylenol (Acetaminophen) two 325-mg tablets every 4 hours as needed.   You should wear loose-fitting shoes or sneakers for the first 2 weeks after the procedure.  You should avoid running, jumping, or strenuous activity for 2 weeks after the surgery.   If you notice any signs of infection including: increased temperature/swelling/redness, thick yellow drainage, fever/chills/nausea/vomiting, please contact the office as soon as possible.

## 2025-07-05 LAB
EST. AVERAGE GLUCOSE BLD GHB EST-MCNC: 269 MG/DL
HBA1C MFR BLD: 11 %

## 2025-07-06 LAB
BACTERIA SPEC AEROBE CULT: ABNORMAL
BACTERIA SPEC AEROBE CULT: ABNORMAL
BACTERIA SPEC ANAEROBE CULT: ABNORMAL
GRAM STN SPEC: ABNORMAL
ORGANISM: ABNORMAL
ORGANISM: ABNORMAL

## 2025-07-07 LAB
BACTERIA BLD CULT ORG #2: NORMAL
BACTERIA BLD CULT: NORMAL

## 2025-07-18 ENCOUNTER — OFFICE VISIT (OUTPATIENT)
Dept: INTERNAL MEDICINE CLINIC | Age: 45
End: 2025-07-18

## 2025-07-18 DIAGNOSIS — E11.9 TYPE 2 DIABETES MELLITUS WITHOUT COMPLICATION, WITHOUT LONG-TERM CURRENT USE OF INSULIN (HCC): ICD-10-CM

## 2025-07-18 DIAGNOSIS — E08.621 DIABETIC ULCER OF OTHER PART OF LEFT FOOT ASSOCIATED WITH DIABETES MELLITUS DUE TO UNDERLYING CONDITION, LIMITED TO BREAKDOWN OF SKIN (HCC): Primary | ICD-10-CM

## 2025-07-18 DIAGNOSIS — L97.521 DIABETIC ULCER OF OTHER PART OF LEFT FOOT ASSOCIATED WITH DIABETES MELLITUS DUE TO UNDERLYING CONDITION, LIMITED TO BREAKDOWN OF SKIN (HCC): Primary | ICD-10-CM

## 2025-07-18 PROCEDURE — 99213 OFFICE O/P EST LOW 20 MIN: CPT

## 2025-07-18 PROCEDURE — 11042 DBRDMT SUBQ TIS 1ST 20SQCM/<: CPT

## 2025-07-18 RX ORDER — SULFAMETHOXAZOLE AND TRIMETHOPRIM 800; 160 MG/1; MG/1
1 TABLET ORAL 2 TIMES DAILY
Qty: 14 TABLET | Refills: 0 | Status: SHIPPED | OUTPATIENT
Start: 2025-07-18 | End: 2025-07-24 | Stop reason: ALTCHOICE

## 2025-07-18 RX ORDER — CEPHALEXIN 500 MG/1
500 CAPSULE ORAL 4 TIMES DAILY
Qty: 28 CAPSULE | Refills: 0 | Status: SHIPPED | OUTPATIENT
Start: 2025-07-18 | End: 2025-07-24 | Stop reason: ALTCHOICE

## 2025-07-18 SDOH — ECONOMIC STABILITY: FOOD INSECURITY: WITHIN THE PAST 12 MONTHS, YOU WORRIED THAT YOUR FOOD WOULD RUN OUT BEFORE YOU GOT MONEY TO BUY MORE.: NEVER TRUE

## 2025-07-18 SDOH — ECONOMIC STABILITY: TRANSPORTATION INSECURITY
IN THE PAST 12 MONTHS, HAS LACK OF TRANSPORTATION KEPT YOU FROM MEETINGS, WORK, OR FROM GETTING THINGS NEEDED FOR DAILY LIVING?: NO

## 2025-07-18 SDOH — ECONOMIC STABILITY: FOOD INSECURITY: WITHIN THE PAST 12 MONTHS, THE FOOD YOU BOUGHT JUST DIDN'T LAST AND YOU DIDN'T HAVE MONEY TO GET MORE.: NEVER TRUE

## 2025-07-18 SDOH — ECONOMIC STABILITY: INCOME INSECURITY: IN THE LAST 12 MONTHS, WAS THERE A TIME WHEN YOU WERE NOT ABLE TO PAY THE MORTGAGE OR RENT ON TIME?: NO

## 2025-07-18 SDOH — ECONOMIC STABILITY: TRANSPORTATION INSECURITY
IN THE PAST 12 MONTHS, HAS THE LACK OF TRANSPORTATION KEPT YOU FROM MEDICAL APPOINTMENTS OR FROM GETTING MEDICATIONS?: NO

## 2025-07-18 ASSESSMENT — PATIENT HEALTH QUESTIONNAIRE - PHQ9
SUM OF ALL RESPONSES TO PHQ QUESTIONS 1-9: 1
SUM OF ALL RESPONSES TO PHQ9 QUESTIONS 1 & 2: 1
1. LITTLE INTEREST OR PLEASURE IN DOING THINGS: SEVERAL DAYS
2. FEELING DOWN, DEPRESSED OR HOPELESS: NOT AT ALL
SUM OF ALL RESPONSES TO PHQ QUESTIONS 1-9: 1
1. LITTLE INTEREST OR PLEASURE IN DOING THINGS: SEVERAL DAYS
2. FEELING DOWN, DEPRESSED OR HOPELESS: NOT AT ALL
SUM OF ALL RESPONSES TO PHQ QUESTIONS 1-9: 1
SUM OF ALL RESPONSES TO PHQ QUESTIONS 1-9: 1

## 2025-07-18 NOTE — PATIENT INSTRUCTIONS
Antibiotic sent to your pharmacy . Start todaay   Schedule MRI  ASAP . Want it done before next visit    Return in 1 week

## 2025-07-18 NOTE — PROGRESS NOTES
Department of Podiatry  Resident Progress Note    tSephen Owens  Allergies: Patient has no known allergies.    SUBJECTIVE  The patient is a 45 y.o. male who presents for a follow up of a left big toe nail avulsion performed in the ED on 7/3/25. Patient has a secondary complaint of  an ulceration located on the ball of his foot that he claims he has had for multiple months. Patient states that he has completed his antibiotics that he was given on discharge (keflex and bactrim) but has started to feel worse since he has finished them. Patient complains of overall drowsiness and nausea. Patient states that the redness in his foot has been present before his recent trip to the ED, but has progressively decreased. He has also been experiencing pain in his left foot for the past few months since the development of the ulceration. He states that upon his ED admission, he had learned that his A1c was 11 and he was then diagnosed with diabetes mellitus type 2. Patient state that he has an appointment with his primary care doctor in the next few weeks to discuss diabetic medication. Patient denies any other pedal complaints. Patient denies f/cp/sob.    Past Medical History:    No past medical history on file.    REVIEW OF SYSTEMS:  Review of Systems: Pertinent positive and negative findings as documented in the HPI, otherwise all other systems were reviewed and were negative.     OBJECTIVE  Patient presents to clinic today with his wife and unassisted wearing sandals. Patient is Aox3 and appears in NAD.     VASCULAR: DP and PT pulses are palpable 2/4 b/l. CFT is brisk to the digits of the foot b/l. Skin temperature is warm to cool from proximal to distal with no focal calor. No edema appreciated to b/l feetNo edema or erythema. No pain with calf compression b/l.    NEUROLOGIC: Gross and epicritic sensation is intact b/l. Protective sensation is appreciated at all pedal sites b/l.    DERMATOLOGIC:   Patient provided verbal

## 2025-07-24 ENCOUNTER — APPOINTMENT (OUTPATIENT)
Dept: GENERAL RADIOLOGY | Age: 45
End: 2025-07-24

## 2025-07-24 ENCOUNTER — HOSPITAL ENCOUNTER (EMERGENCY)
Age: 45
Discharge: HOME | End: 2025-07-24
Attending: EMERGENCY MEDICINE

## 2025-07-24 VITALS
OXYGEN SATURATION: 99 % | WEIGHT: 260 LBS | TEMPERATURE: 98.3 F | SYSTOLIC BLOOD PRESSURE: 129 MMHG | DIASTOLIC BLOOD PRESSURE: 79 MMHG | BODY MASS INDEX: 35.21 KG/M2 | RESPIRATION RATE: 18 BRPM | HEIGHT: 72 IN | HEART RATE: 82 BPM

## 2025-07-24 DIAGNOSIS — E87.1 HYPONATREMIA: ICD-10-CM

## 2025-07-24 DIAGNOSIS — E11.9 DIABETES MELLITUS, NEW ONSET (HCC): Primary | ICD-10-CM

## 2025-07-24 DIAGNOSIS — L97.528 ULCER OF LEFT FOOT WITH OTHER SEVERITY (HCC): ICD-10-CM

## 2025-07-24 LAB
ALBUMIN SERPL-MCNC: 4 G/DL (ref 3.4–5)
ALBUMIN/GLOB SERPL: 1.1 {RATIO} (ref 1.1–2.2)
ALP SERPL-CCNC: 89 U/L (ref 40–129)
ALT SERPL-CCNC: 19 U/L (ref 10–40)
ANION GAP SERPL CALCULATED.3IONS-SCNC: 11 MMOL/L (ref 3–16)
ANION GAP SERPL CALCULATED.3IONS-SCNC: 8 MMOL/L (ref 3–16)
AST SERPL-CCNC: 25 U/L (ref 15–37)
BACTERIA URNS QL MICRO: ABNORMAL /HPF
BASOPHILS # BLD: 0.1 K/UL (ref 0–0.2)
BASOPHILS NFR BLD: 1.2 %
BILIRUB SERPL-MCNC: 0.7 MG/DL (ref 0–1)
BILIRUB UR QL STRIP.AUTO: ABNORMAL
BUN SERPL-MCNC: 13 MG/DL (ref 7–20)
BUN SERPL-MCNC: 14 MG/DL (ref 7–20)
CALCIUM SERPL-MCNC: 8.2 MG/DL (ref 8.3–10.6)
CALCIUM SERPL-MCNC: 8.9 MG/DL (ref 8.3–10.6)
CHLORIDE SERPL-SCNC: 92 MMOL/L (ref 99–110)
CHLORIDE SERPL-SCNC: 94 MMOL/L (ref 99–110)
CLARITY UR: CLEAR
CO2 SERPL-SCNC: 26 MMOL/L (ref 21–32)
CO2 SERPL-SCNC: 27 MMOL/L (ref 21–32)
COLOR UR: YELLOW
CREAT SERPL-MCNC: 0.6 MG/DL (ref 0.9–1.3)
CREAT SERPL-MCNC: 0.7 MG/DL (ref 0.9–1.3)
CRP SERPL-MCNC: 61.4 MG/L (ref 0–5.1)
DEPRECATED RDW RBC AUTO: 14 % (ref 12.4–15.4)
EKG ATRIAL RATE: 105 BPM
EKG DIAGNOSIS: NORMAL
EKG P AXIS: 53 DEGREES
EKG P-R INTERVAL: 146 MS
EKG Q-T INTERVAL: 358 MS
EKG QRS DURATION: 98 MS
EKG QTC CALCULATION (BAZETT): 473 MS
EKG R AXIS: 94 DEGREES
EKG T AXIS: 21 DEGREES
EKG VENTRICULAR RATE: 105 BPM
EOSINOPHIL # BLD: 0 K/UL (ref 0–0.6)
EOSINOPHIL NFR BLD: 0.2 %
ERYTHROCYTE [SEDIMENTATION RATE] IN BLOOD BY WESTERGREN METHOD: 31 MM/HR (ref 0–15)
GFR SERPLBLD CREATININE-BSD FMLA CKD-EPI: >90 ML/MIN/{1.73_M2}
GFR SERPLBLD CREATININE-BSD FMLA CKD-EPI: >90 ML/MIN/{1.73_M2}
GLUCOSE SERPL-MCNC: 252 MG/DL (ref 70–99)
GLUCOSE SERPL-MCNC: 298 MG/DL (ref 70–99)
GLUCOSE UR STRIP.AUTO-MCNC: 500 MG/DL
HCT VFR BLD AUTO: 40.2 % (ref 40.5–52.5)
HGB BLD-MCNC: 13.9 G/DL (ref 13.5–17.5)
HGB UR QL STRIP.AUTO: NEGATIVE
KETONES UR STRIP.AUTO-MCNC: 40 MG/DL
LEUKOCYTE ESTERASE UR QL STRIP.AUTO: NEGATIVE
LIPASE SERPL-CCNC: 39 U/L (ref 13–60)
LYMPHOCYTES # BLD: 1.5 K/UL (ref 1–5.1)
LYMPHOCYTES NFR BLD: 27 %
MCH RBC QN AUTO: 25.3 PG (ref 26–34)
MCHC RBC AUTO-ENTMCNC: 34.5 G/DL (ref 31–36)
MCV RBC AUTO: 73.5 FL (ref 80–100)
MONOCYTES # BLD: 0.5 K/UL (ref 0–1.3)
MONOCYTES NFR BLD: 8.6 %
NEUTROPHILS # BLD: 3.5 K/UL (ref 1.7–7.7)
NEUTROPHILS NFR BLD: 63 %
NITRITE UR QL STRIP.AUTO: NEGATIVE
PH UR STRIP.AUTO: 6 [PH] (ref 5–8)
PLATELET # BLD AUTO: 155 K/UL (ref 135–450)
PMV BLD AUTO: 8.2 FL (ref 5–10.5)
POTASSIUM SERPL-SCNC: 4.1 MMOL/L (ref 3.5–5.1)
POTASSIUM SERPL-SCNC: 4.1 MMOL/L (ref 3.5–5.1)
PROT SERPL-MCNC: 7.5 G/DL (ref 6.4–8.2)
PROT UR STRIP.AUTO-MCNC: ABNORMAL MG/DL
RBC # BLD AUTO: 5.47 M/UL (ref 4.2–5.9)
RBC #/AREA URNS HPF: ABNORMAL /HPF (ref 0–4)
SODIUM SERPL-SCNC: 129 MMOL/L (ref 136–145)
SODIUM SERPL-SCNC: 129 MMOL/L (ref 136–145)
SP GR UR STRIP.AUTO: >=1.03 (ref 1–1.03)
TROPONIN, HIGH SENSITIVITY: 10 NG/L (ref 0–22)
UA COMPLETE W REFLEX CULTURE PNL UR: ABNORMAL
UA DIPSTICK W REFLEX MICRO PNL UR: YES
URN SPEC COLLECT METH UR: ABNORMAL
UROBILINOGEN UR STRIP-ACNC: 0.2 E.U./DL
WBC # BLD AUTO: 5.6 K/UL (ref 4–11)
WBC #/AREA URNS HPF: ABNORMAL /HPF (ref 0–5)

## 2025-07-24 PROCEDURE — 80053 COMPREHEN METABOLIC PANEL: CPT

## 2025-07-24 PROCEDURE — 84484 ASSAY OF TROPONIN QUANT: CPT

## 2025-07-24 PROCEDURE — 99285 EMERGENCY DEPT VISIT HI MDM: CPT

## 2025-07-24 PROCEDURE — 83690 ASSAY OF LIPASE: CPT

## 2025-07-24 PROCEDURE — 83036 HEMOGLOBIN GLYCOSYLATED A1C: CPT

## 2025-07-24 PROCEDURE — 2580000003 HC RX 258

## 2025-07-24 PROCEDURE — 85652 RBC SED RATE AUTOMATED: CPT

## 2025-07-24 PROCEDURE — 71046 X-RAY EXAM CHEST 2 VIEWS: CPT

## 2025-07-24 PROCEDURE — 81001 URINALYSIS AUTO W/SCOPE: CPT

## 2025-07-24 PROCEDURE — 86140 C-REACTIVE PROTEIN: CPT

## 2025-07-24 PROCEDURE — 85025 COMPLETE CBC W/AUTO DIFF WBC: CPT

## 2025-07-24 PROCEDURE — 73630 X-RAY EXAM OF FOOT: CPT

## 2025-07-24 PROCEDURE — 93005 ELECTROCARDIOGRAM TRACING: CPT | Performed by: EMERGENCY MEDICINE

## 2025-07-24 RX ORDER — 0.9 % SODIUM CHLORIDE 0.9 %
1000 INTRAVENOUS SOLUTION INTRAVENOUS ONCE
Status: COMPLETED | OUTPATIENT
Start: 2025-07-24 | End: 2025-07-24

## 2025-07-24 RX ADMIN — SODIUM CHLORIDE 1000 ML: 0.9 INJECTION, SOLUTION INTRAVENOUS at 13:28

## 2025-07-24 NOTE — ED PROVIDER NOTES
ED Attending Attestation Note     Date of evaluation: 7/24/2025    This patient was seen by the resident.  I have seen and examined the patient, agree with the workup, evaluation, management and diagnosis. The care plan has been discussed.  My assessment reveals a patient that presents to the ED complaining of fatigue and just not feeling well.  Recently diagnosed with MRSA of left great toe.  He has had a 10 to 14-day course of Keflex and Bactrim which was initiated with podiatry after they removed his great toenail.  There is a gap of for 5 days when podiatry started him on the same antibiotic's which she is almost done with.  On exam he has a ulcer overlying the plantar surface near his second MTP joint.  No significant surrounding erythema warmth and there is no discharge from that wound.  No bone is exposed.  No erythema on plantar or dorsal aspect although he is does have some erythema around the great toe nail.      Obtaining lab and inflammatory markers.  Will obtain x-ray.  Will discuss with podiatry once all labs are back.  He denies hallucinations.  He does say he is a bit irritable.  He denies confusion.  Family member states that he is a little bit slower than usual.     Miguel Robles MD  07/24/25 2243    
OP clear  Neck: Supple, trachea ML  Pulm: Normal WOB on RA, CTAB  CV: RRR  Abd: Soft, ND, NT  MSK: No deformity, no edema  -Wound on plantar surface as shown below, no induration or surrounding erythema  Ext: WWP, B/L PP palp  Skin: W/D  Neuro: A&O, speech/mentation WNL, MAEW    Left foot today (7/24)      Left foot on 7/3/25:      ED Course     Labs:  See EHR, pertinent findings discussed in [facility]    Imaging:  See EHR, pertinent findings discussed in [facility]    EKG:  Interpreted by EM physician  Indication: Fatigue, weakness  Sinus tachycardia, rate 105  Slight right axis deviation, otherwise normal intervals and conduction  Subtle T wave inversion lead III, normal variant  ST segments and T waves otherwise normal  Comparison: 7/1/2019, no significant  Impression: Sinus tachycardia, RAD, otherwise normal    See separate procedure note(s) for any procedures completed in ED    ED Course as of 07/24/25 1730   Thu Jul 24, 2025   1301 CRP(!): 61.4 [IL]   1301 Glucose(!): 298  Diagnostic of diabetes - new diagnosis; also has significant glucosuria (>500). Will send A1c. [IL]   1302 Anion Gap: 11 [IL]   1302 Sodium(!): 129  Corrects to 132/134 [IL]   1653 Sodium(!): 129  Corrects to 131/133 -- labs three weeks ago with sodium 133 [IL]      ED Course User Index  [IL] Carley Allen MD       Orders Placed This Encounter   Medications    sodium chloride 0.9 % bolus 1,000 mL    metFORMIN (GLUCOPHAGE) 500 MG tablet     Sig: Take 1 tablet by mouth 2 times daily (with meals)     Dispense:  60 tablet     Refill:  0         RECENT VITALS:  BP: 132/74, Temp: 98.3 °F (36.8 °C), Pulse: 88,Respirations: 14, SpO2: 96 %     Past History     He has no past medical history on file.  He has a past surgical history that includes joint replacement and Anterior cruciate ligament repair.  His family history includes Diabetes in his father; Heart Attack in his maternal grandfather; Heart Disease in his father.  He reports that he has never smoked. He

## 2025-07-24 NOTE — DISCHARGE INSTRUCTIONS
Stop Bactrim, no further antibiotics at this time. Need to get MRI to ensure no destruction or infection of your bone.    You have diabetes. You need to get a primary care doctor - especially now that you have diabetes. We will start you on a low dose medication for diabetes - but you need to have blood work rechecked after starting this medication to make sure it is not causing any issues with your kidneys or electrolytes. It is not uncommon to have some GI upset with this medication. If you are having vomiting, difficulty eating, significant diarrhea, or other concerns - see primary care clinic ASAP (same day) or go to ED to be re-evaluated.    Start new medication - Metformin - 1 tablet (500 mg) every morning and every evening with food    Call primary care clinic to schedule soonest available appointment to establish care and follow-up new diabetes  Call MRI scheduling to schedule MRI of your foot as soon as possible  See Podiatry as scheduled, call them sooner if any questions or concerns    Podiatry Wound Care Discharge Instructions  Please perform every other day dressing changes to left lower extremity as follows  -Apply betadine to the wound on the left lower leg  -Next apply gauze to the top of the left foot, ankle, and leg  -Next loosely wrap the left lower extremity with Kerlix starting from just in front of the toes and ending just below the knee  -Next gently wrap the left foot with 4 inch Ace bandage starting from just in front of the toes and ending just above the ankle  Patient is Partial heel weightbearing Left lower extremity    Please follow-up with Dr. Deshawn Michel DPM for Wound care

## 2025-07-24 NOTE — CONSULTS
Department of Podiatry Consult Note  Resident      Reason for Consult: Concern for osteo of left toe/foot  Requesting Physician:  Dr. Miguel Robles    CHIEF COMPLAINT: Left foot diabetic ulcer    HISTORY OF PRESENT ILLNESS:    The patient is a 45 y.o. male with significant past medical history as listed below who is consulted to podiatry for a left foot diabetic ulcer.  Patient was recently seen at the [facility] podiatry clinic where he was prescribed Keflex and Bactrim due to increased erythema surrounding the ulceration.  The patient's most recent x-rays were negative for osteomyelitis so an MRI was ordered.  Patient states that he is still feeling nauseous and tired, but believes that the redness has improved.  Patient states that the ulceration is still very painful.  Patient denies any drainage from the ulceration. Patient has not visited a primary care doctor since his recent diagnosis of diabetes mellitus in the emergency department on 7/3/25. Patient denies any V/F/SOB/CP. Patient denies any other pedal complaints today.    Past Medical History:    History reviewed. No pertinent past medical history.    Past Surgical History:        Procedure Laterality Date    ANTERIOR CRUCIATE LIGAMENT REPAIR      JOINT REPLACEMENT      lt knee       Allergies:   Patient has no known allergies.    Medications:   Home Meds  No current facility-administered medications on file prior to encounter.     Current Outpatient Medications on File Prior to Encounter   Medication Sig Dispense Refill    cephALEXin (KEFLEX) 500 MG capsule Take 1 capsule by mouth 4 times daily for 7 days 28 capsule 0    sulfamethoxazole-trimethoprim (BACTRIM DS;SEPTRA DS) 800-160 MG per tablet Take 1 tablet by mouth 2 times daily for 7 days 14 tablet 0    naloxone 4 MG/0.1ML LIQD nasal spray 1 spray by Nasal route as needed for Opioid Reversal         Current Meds  sodium chloride 0.9 % bolus 1,000 mL, Once        Family History:   Family History   Problem

## 2025-07-25 LAB
EST. AVERAGE GLUCOSE BLD GHB EST-MCNC: 260.4 MG/DL
HBA1C MFR BLD: 10.7 %

## 2025-07-28 ENCOUNTER — OFFICE VISIT (OUTPATIENT)
Dept: INTERNAL MEDICINE CLINIC | Age: 45
End: 2025-07-28

## 2025-07-28 VITALS
DIASTOLIC BLOOD PRESSURE: 76 MMHG | WEIGHT: 258.2 LBS | HEART RATE: 97 BPM | SYSTOLIC BLOOD PRESSURE: 137 MMHG | BODY MASS INDEX: 34.97 KG/M2 | HEIGHT: 72 IN | RESPIRATION RATE: 16 BRPM | TEMPERATURE: 97.1 F | OXYGEN SATURATION: 97 %

## 2025-07-28 DIAGNOSIS — E11.621 TYPE 2 DIABETES MELLITUS WITH FOOT ULCER, WITHOUT LONG-TERM CURRENT USE OF INSULIN (HCC): ICD-10-CM

## 2025-07-28 DIAGNOSIS — L97.509 TYPE 2 DIABETES MELLITUS WITH FOOT ULCER, WITHOUT LONG-TERM CURRENT USE OF INSULIN (HCC): ICD-10-CM

## 2025-07-28 DIAGNOSIS — E78.5 HYPERLIPIDEMIA, UNSPECIFIED HYPERLIPIDEMIA TYPE: ICD-10-CM

## 2025-07-28 DIAGNOSIS — E11.621 TYPE 2 DIABETES MELLITUS WITH FOOT ULCER, WITHOUT LONG-TERM CURRENT USE OF INSULIN (HCC): Primary | ICD-10-CM

## 2025-07-28 DIAGNOSIS — L97.509 TYPE 2 DIABETES MELLITUS WITH FOOT ULCER, WITHOUT LONG-TERM CURRENT USE OF INSULIN (HCC): Primary | ICD-10-CM

## 2025-07-28 DIAGNOSIS — I10 HYPERTENSION, UNSPECIFIED TYPE: ICD-10-CM

## 2025-07-28 LAB
GLUCOSE BLD-MCNC: 221 MG/DL (ref 70–99)
PERFORMED ON: ABNORMAL

## 2025-07-28 PROCEDURE — 99213 OFFICE O/P EST LOW 20 MIN: CPT

## 2025-07-28 RX ORDER — ACYCLOVIR 800 MG/1
1 TABLET ORAL DAILY PRN
Qty: 1 EACH | Refills: 3 | Status: SHIPPED | OUTPATIENT
Start: 2025-07-28

## 2025-07-28 RX ORDER — INSULIN GLARGINE 100 [IU]/ML
10 INJECTION, SOLUTION SUBCUTANEOUS NIGHTLY
Qty: 5 ADJUSTABLE DOSE PRE-FILLED PEN SYRINGE | Refills: 3 | Status: SHIPPED | OUTPATIENT
Start: 2025-07-28

## 2025-07-28 RX ORDER — HYDROCHLOROTHIAZIDE 12.5 MG/1
1 CAPSULE ORAL DAILY PRN
Qty: 1 EACH | Refills: 2 | Status: SHIPPED | OUTPATIENT
Start: 2025-07-28

## 2025-07-28 RX ORDER — KETOROLAC TROMETHAMINE 30 MG/ML
1 INJECTION, SOLUTION INTRAMUSCULAR; INTRAVENOUS DAILY PRN
Qty: 1 EACH | Refills: 0 | Status: SHIPPED | OUTPATIENT
Start: 2025-07-28

## 2025-07-28 RX ORDER — ACYCLOVIR 800 MG/1
1 TABLET ORAL DAILY PRN
Refills: 3 | OUTPATIENT
Start: 2025-07-28

## 2025-07-28 RX ORDER — ROSUVASTATIN CALCIUM 20 MG/1
20 TABLET, COATED ORAL NIGHTLY
Qty: 90 TABLET | Refills: 0 | Status: SHIPPED | OUTPATIENT
Start: 2025-07-28

## 2025-07-28 NOTE — TELEPHONE ENCOUNTER
Requested Prescriptions     Pending Prescriptions Disp Refills    Continuous Glucose Sensor (FREESTYLE ALICIA 3 SENSOR) MISC [Pharmacy Med Name: FREESTYLE ALICIA 3 SENSOR]  3     Si UNITS BY DOES NOT APPLY ROUTE DAILY AS NEEDED (GLUCOSE CHECKS)       Last Clinic Visit:  2025     Next Clinic Appointment:  2025

## 2025-07-28 NOTE — PROGRESS NOTES
[facility]    Stephen Owens is a 45 y.o. male, here for evaluation of the following concerns:    Diabetes        Stephen Owens is a 45-year-old male with a history of type 2 diabetes presents ED follow-up.  Patient was recently went to the ED twice in the last month for a diabetic foot ulcer.  Patient was initially treated with Bactrim and Keflex, symptoms had not initially improved but then recurred.  On the most recent ED follow-up, patient's foot ulcer was evaluated by podiatry who noted that the ulcer had improved and his general unwellness (headaches, nausea, vomiting] were due to the uncontrolled diabetes.  Patient was started on metformin 500 mg twice daily in ED and was asked to follow-up with the clinic.  Today patient states that he is symptoms have improved, endorses that his headaches, nausea, vomiting have all improved since starting the metformin.    Review of Systems, see above, all other systems reviewed otherwise were negative.  Review    MEDICATIONS:  Prior to Visit Medications    Medication Sig Taking? Authorizing Provider   insulin glargine (LANTUS SOLOSTAR) 100 UNIT/ML injection pen Inject 10 Units into the skin nightly Yes Omari Beard MD   rosuvastatin (CRESTOR) 20 MG tablet Take 1 tablet by mouth nightly Yes Omari Beard MD   metFORMIN (GLUCOPHAGE) 500 MG tablet Take 2 tablets by mouth 2 times daily (with meals) Yes Omari Beard MD   Continuous Glucose  (FREESTYLE ALICIA 3 READER) CRYSTAL 1 kit by Does not apply route daily as needed (glucose checks) Yes Omari Beard MD   Continuous Glucose Sensor (FREESTYLE ALICIA 3 SENSOR) MISC 1 Units by Does not apply route daily as needed (glucose checks) Yes Omari Beard MD   naloxone 4 MG/0.1ML LIQD nasal spray 1 spray by Nasal route as needed for Opioid Reversal  Provider, MD Stephanie        Vitals:    07/28/25 0950 07/28/25 0952   BP: (!) 143/85 137/76   BP Site: Left

## 2025-07-28 NOTE — PATIENT INSTRUCTIONS
Please start taking the lantus 10 units nightly and increase your metformin to 1000mg twice daily. Please start checking your blood glucose three days daily. Please start taking the Crestor, 20mg daily. Please complete your blood work prior to next visit. Please check your blood pressure at home and record your BP prior to next visit.                                                                                                                                              Please attend the class with Dr Jacobs for Diabetes Education.   Every Thursday at 12:00 noon.  Class is free and lasts about 1.5 hrs.  Call ahead to let us know when you are coming.  [phone]

## 2025-07-29 LAB
CHOLEST SERPL-MCNC: 125 MG/DL (ref 0–199)
CREAT UR-MCNC: 167 MG/DL (ref 39–259)
HDLC SERPL-MCNC: 17 MG/DL (ref 40–60)
LDLC SERPL CALC-MCNC: ABNORMAL MG/DL
LDLC SERPL-MCNC: 50 MG/DL
MICROALBUMIN UR DL<=1MG/L-MCNC: 6.16 MG/DL
MICROALBUMIN/CREAT UR: 36.9 MG/G (ref 0–30)
TRIGL SERPL-MCNC: 327 MG/DL (ref 0–150)
VLDLC SERPL CALC-MCNC: ABNORMAL MG/DL